# Patient Record
Sex: MALE | Race: WHITE | NOT HISPANIC OR LATINO | Employment: UNEMPLOYED | URBAN - METROPOLITAN AREA
[De-identification: names, ages, dates, MRNs, and addresses within clinical notes are randomized per-mention and may not be internally consistent; named-entity substitution may affect disease eponyms.]

---

## 2017-11-10 ENCOUNTER — OFFICE VISIT (OUTPATIENT)
Dept: INTERNAL MEDICINE | Facility: CLINIC | Age: 37
End: 2017-11-10

## 2017-11-10 VITALS
OXYGEN SATURATION: 99 % | HEIGHT: 72 IN | DIASTOLIC BLOOD PRESSURE: 76 MMHG | BODY MASS INDEX: 27.63 KG/M2 | HEART RATE: 86 BPM | WEIGHT: 204 LBS | SYSTOLIC BLOOD PRESSURE: 124 MMHG

## 2017-11-10 DIAGNOSIS — J02.9 PHARYNGITIS, UNSPECIFIED ETIOLOGY: Primary | ICD-10-CM

## 2017-11-10 LAB
EXPIRATION DATE: NORMAL
INTERNAL CONTROL: NORMAL
Lab: NORMAL
S PYO AG THROAT QL: NEGATIVE

## 2017-11-10 PROCEDURE — 87880 STREP A ASSAY W/OPTIC: CPT | Performed by: NURSE PRACTITIONER

## 2017-11-10 PROCEDURE — 99213 OFFICE O/P EST LOW 20 MIN: CPT | Performed by: NURSE PRACTITIONER

## 2017-11-10 PROCEDURE — 90471 IMMUNIZATION ADMIN: CPT | Performed by: NURSE PRACTITIONER

## 2017-11-10 PROCEDURE — 90686 IIV4 VACC NO PRSV 0.5 ML IM: CPT | Performed by: NURSE PRACTITIONER

## 2017-11-10 NOTE — PROGRESS NOTES
"Subjective  Nasal Congestion ( x 2 days)      Apolinar Umana is a 37 y.o. male.   Allergies   Allergen Reactions   • Keflex [Cephalexin]      History of Present Illness      Sore throat started yesterday , sinuses have been drainage , blowing nose a lot, very thick , no cough, no fever/chills, has tried tylenol, nyquil w/o relief   The following portions of the patient's history were reviewed and updated as appropriate: allergies, past family history, past surgical history and problem list.    Review of Systems   Constitutional: Negative for chills, fatigue and fever.   HENT: Positive for rhinorrhea and sore throat.    All other systems reviewed and are negative.      Objective   Physical Exam   Constitutional: He is oriented to person, place, and time. He appears well-developed and well-nourished.   HENT:   Head: Normocephalic and atraumatic.   Right Ear: External ear normal.   lg amt pnd   Eyes: Conjunctivae are normal.   Cardiovascular: Normal rate and regular rhythm.    Pulmonary/Chest: Effort normal and breath sounds normal.   Lymphadenopathy:     He has no cervical adenopathy.   Neurological: He is alert and oriented to person, place, and time.   Skin: Skin is warm and dry.   Psychiatric: He has a normal mood and affect. His behavior is normal. Judgment and thought content normal.   Nursing note and vitals reviewed.    /76  Pulse 86  Ht 72\" (182.9 cm)  Wt 204 lb (92.5 kg)  SpO2 99%  BMI 27.67 kg/m2    Assessment/Plan     Problem List Items Addressed This Visit     None      Visit Diagnoses     Pharyngitis, unspecified etiology    -  Primary    Relevant Orders    POC Rapid Strep A (Completed)          Warm salt water gargles, tylenol, ibuprofen prn       Results for orders placed or performed in visit on 11/10/17   POC Rapid Strep A   Result Value Ref Range    Rapid Strep A Screen Negative Negative, VALID, INVALID, Not Performed    Internal Control Passed Passed    Lot Number [id number]     Expiration " Date 02/28/2019

## 2018-07-17 PROCEDURE — 87186 SC STD MICRODIL/AGAR DIL: CPT | Performed by: PHYSICIAN ASSISTANT

## 2018-07-17 PROCEDURE — 87205 SMEAR GRAM STAIN: CPT | Performed by: PHYSICIAN ASSISTANT

## 2018-07-17 PROCEDURE — 87077 CULTURE AEROBIC IDENTIFY: CPT | Performed by: PHYSICIAN ASSISTANT

## 2018-07-17 PROCEDURE — 87147 CULTURE TYPE IMMUNOLOGIC: CPT | Performed by: PHYSICIAN ASSISTANT

## 2018-07-17 PROCEDURE — 87070 CULTURE OTHR SPECIMN AEROBIC: CPT | Performed by: PHYSICIAN ASSISTANT

## 2018-07-20 ENCOUNTER — TELEPHONE (OUTPATIENT)
Dept: URGENT CARE | Facility: CLINIC | Age: 38
End: 2018-07-20

## 2018-07-21 ENCOUNTER — TELEPHONE (OUTPATIENT)
Dept: URGENT CARE | Facility: CLINIC | Age: 38
End: 2018-07-21

## 2021-10-16 ENCOUNTER — HOSPITAL ENCOUNTER (EMERGENCY)
Age: 41
Discharge: HOME | End: 2021-10-16
Payer: SELF-PAY

## 2021-10-16 VITALS
RESPIRATION RATE: 19 BRPM | DIASTOLIC BLOOD PRESSURE: 92 MMHG | TEMPERATURE: 98.24 F | HEART RATE: 68 BPM | OXYGEN SATURATION: 99 % | SYSTOLIC BLOOD PRESSURE: 149 MMHG

## 2021-10-16 VITALS
HEART RATE: 68 BPM | RESPIRATION RATE: 19 BRPM | TEMPERATURE: 98.24 F | SYSTOLIC BLOOD PRESSURE: 149 MMHG | OXYGEN SATURATION: 99 % | DIASTOLIC BLOOD PRESSURE: 92 MMHG

## 2021-10-16 VITALS — BODY MASS INDEX: 28.6 KG/M2

## 2021-10-16 DIAGNOSIS — J01.00: ICD-10-CM

## 2021-10-16 DIAGNOSIS — F17.210: ICD-10-CM

## 2021-10-16 DIAGNOSIS — I10: ICD-10-CM

## 2021-10-16 PROCEDURE — 99202 OFFICE O/P NEW SF 15 MIN: CPT

## 2021-10-16 PROCEDURE — G0463 HOSPITAL OUTPT CLINIC VISIT: HCPCS

## 2023-10-28 ENCOUNTER — HOSPITAL ENCOUNTER (EMERGENCY)
Age: 43
Discharge: HOME | End: 2023-10-28
Payer: COMMERCIAL

## 2023-10-28 VITALS — HEART RATE: 83 BPM | RESPIRATION RATE: 18 BRPM | TEMPERATURE: 98.6 F | OXYGEN SATURATION: 98 %

## 2023-10-28 VITALS — BODY MASS INDEX: 30.9 KG/M2

## 2023-10-28 VITALS — OXYGEN SATURATION: 98 % | HEART RATE: 83 BPM | TEMPERATURE: 98.6 F | RESPIRATION RATE: 18 BRPM

## 2023-10-28 DIAGNOSIS — F17.210: ICD-10-CM

## 2023-10-28 DIAGNOSIS — J01.01: Primary | ICD-10-CM

## 2023-10-28 PROCEDURE — 87880 STREP A ASSAY W/OPTIC: CPT

## 2023-10-28 PROCEDURE — 87804 INFLUENZA ASSAY W/OPTIC: CPT

## 2023-10-28 PROCEDURE — G0463 HOSPITAL OUTPT CLINIC VISIT: HCPCS

## 2023-10-28 PROCEDURE — 99212 OFFICE O/P EST SF 10 MIN: CPT

## 2023-10-28 PROCEDURE — 99214 OFFICE O/P EST MOD 30 MIN: CPT

## 2023-11-28 ENCOUNTER — HOSPITAL ENCOUNTER (EMERGENCY)
Age: 43
Discharge: HOME | End: 2023-11-28
Payer: COMMERCIAL

## 2023-11-28 VITALS — HEART RATE: 95 BPM | RESPIRATION RATE: 18 BRPM | OXYGEN SATURATION: 97 % | TEMPERATURE: 98.42 F

## 2023-11-28 VITALS — OXYGEN SATURATION: 97 % | HEART RATE: 95 BPM | RESPIRATION RATE: 18 BRPM | TEMPERATURE: 98.42 F

## 2023-11-28 VITALS — BODY MASS INDEX: 30.2 KG/M2

## 2023-11-28 DIAGNOSIS — M54.2: ICD-10-CM

## 2023-11-28 DIAGNOSIS — J02.0: Primary | ICD-10-CM

## 2023-11-28 DIAGNOSIS — F17.210: ICD-10-CM

## 2023-11-28 DIAGNOSIS — R09.81: ICD-10-CM

## 2023-11-28 DIAGNOSIS — R07.0: ICD-10-CM

## 2023-11-28 DIAGNOSIS — R53.81: ICD-10-CM

## 2023-11-28 DIAGNOSIS — R51.9: ICD-10-CM

## 2023-11-28 PROCEDURE — 99212 OFFICE O/P EST SF 10 MIN: CPT

## 2023-11-28 PROCEDURE — 99214 OFFICE O/P EST MOD 30 MIN: CPT

## 2023-11-28 PROCEDURE — 87880 STREP A ASSAY W/OPTIC: CPT

## 2023-11-28 PROCEDURE — G0463 HOSPITAL OUTPT CLINIC VISIT: HCPCS

## 2024-05-03 ENCOUNTER — HOSPITAL ENCOUNTER (EMERGENCY)
Age: 44
Discharge: HOME | End: 2024-05-03
Payer: COMMERCIAL

## 2024-05-03 VITALS
OXYGEN SATURATION: 96 % | SYSTOLIC BLOOD PRESSURE: 174 MMHG | DIASTOLIC BLOOD PRESSURE: 112 MMHG | TEMPERATURE: 98.42 F | RESPIRATION RATE: 20 BRPM | HEART RATE: 98 BPM

## 2024-05-03 VITALS
DIASTOLIC BLOOD PRESSURE: 119 MMHG | TEMPERATURE: 98.42 F | SYSTOLIC BLOOD PRESSURE: 185 MMHG | RESPIRATION RATE: 20 BRPM | HEART RATE: 98 BPM | OXYGEN SATURATION: 96 %

## 2024-05-03 VITALS — BODY MASS INDEX: 31.1 KG/M2

## 2024-05-03 DIAGNOSIS — R09.81: ICD-10-CM

## 2024-05-03 DIAGNOSIS — R09.82: ICD-10-CM

## 2024-05-03 DIAGNOSIS — J01.90: Primary | ICD-10-CM

## 2024-05-03 PROCEDURE — G0463 HOSPITAL OUTPT CLINIC VISIT: HCPCS

## 2024-05-03 PROCEDURE — 99214 OFFICE O/P EST MOD 30 MIN: CPT

## 2024-05-03 PROCEDURE — 99212 OFFICE O/P EST SF 10 MIN: CPT

## 2024-05-26 ENCOUNTER — HOSPITAL ENCOUNTER (EMERGENCY)
Age: 44
Discharge: HOME | End: 2024-05-26
Payer: COMMERCIAL

## 2024-05-26 VITALS
OXYGEN SATURATION: 97 % | SYSTOLIC BLOOD PRESSURE: 164 MMHG | TEMPERATURE: 98.06 F | RESPIRATION RATE: 21 BRPM | DIASTOLIC BLOOD PRESSURE: 89 MMHG | HEART RATE: 92 BPM

## 2024-05-26 VITALS
OXYGEN SATURATION: 97 % | HEART RATE: 92 BPM | SYSTOLIC BLOOD PRESSURE: 164 MMHG | TEMPERATURE: 98.1 F | DIASTOLIC BLOOD PRESSURE: 89 MMHG | RESPIRATION RATE: 21 BRPM

## 2024-05-26 VITALS — BODY MASS INDEX: 30.4 KG/M2

## 2024-05-26 DIAGNOSIS — R09.81: ICD-10-CM

## 2024-05-26 DIAGNOSIS — R05.9: ICD-10-CM

## 2024-05-26 DIAGNOSIS — J01.90: Primary | ICD-10-CM

## 2024-05-26 DIAGNOSIS — F17.210: ICD-10-CM

## 2024-05-26 PROCEDURE — G0463 HOSPITAL OUTPT CLINIC VISIT: HCPCS

## 2024-05-26 PROCEDURE — 99214 OFFICE O/P EST MOD 30 MIN: CPT

## 2024-05-26 PROCEDURE — 87880 STREP A ASSAY W/OPTIC: CPT

## 2024-05-26 PROCEDURE — 99212 OFFICE O/P EST SF 10 MIN: CPT

## 2024-08-25 ENCOUNTER — HOSPITAL ENCOUNTER (EMERGENCY)
Age: 44
Discharge: HOME | End: 2024-08-25
Payer: COMMERCIAL

## 2024-08-25 VITALS
OXYGEN SATURATION: 94 % | TEMPERATURE: 98.06 F | HEART RATE: 101 BPM | SYSTOLIC BLOOD PRESSURE: 182 MMHG | DIASTOLIC BLOOD PRESSURE: 127 MMHG | RESPIRATION RATE: 18 BRPM

## 2024-08-25 VITALS
DIASTOLIC BLOOD PRESSURE: 126 MMHG | SYSTOLIC BLOOD PRESSURE: 180 MMHG | TEMPERATURE: 98.1 F | HEART RATE: 104 BPM | RESPIRATION RATE: 18 BRPM | OXYGEN SATURATION: 94 %

## 2024-08-25 VITALS — BODY MASS INDEX: 30.5 KG/M2

## 2024-08-25 DIAGNOSIS — J20.9: Primary | ICD-10-CM

## 2024-08-25 DIAGNOSIS — R07.0: ICD-10-CM

## 2024-08-25 DIAGNOSIS — J01.90: ICD-10-CM

## 2024-08-25 DIAGNOSIS — R50.9: ICD-10-CM

## 2024-08-25 PROCEDURE — G0463 HOSPITAL OUTPT CLINIC VISIT: HCPCS

## 2024-08-25 PROCEDURE — 99214 OFFICE O/P EST MOD 30 MIN: CPT

## 2024-08-25 PROCEDURE — 99212 OFFICE O/P EST SF 10 MIN: CPT
